# Patient Record
Sex: MALE | Race: WHITE | Employment: FULL TIME
[De-identification: names, ages, dates, MRNs, and addresses within clinical notes are randomized per-mention and may not be internally consistent; named-entity substitution may affect disease eponyms.]

---

## 2020-04-04 ENCOUNTER — NURSE TRIAGE (OUTPATIENT)
Dept: OTHER | Facility: CLINIC | Age: 50
End: 2020-04-04

## 2020-04-04 NOTE — TELEPHONE ENCOUNTER
Reason for Disposition   1] COVID-19 infection diagnosed or suspected AND [2] mild symptoms (fever, cough) AND [2] no trouble breathing or other complications    Answer Assessment - Initial Assessment Questions  1. COVID-19 DIAGNOSIS: \"Who made your Coronavirus (COVID-19) diagnosis? \" \"Was it confirmed by a positive lab test?\" If not diagnosed by a HCP, ask \"Are there lots of cases (community spread) where you live? \" (See public health department website, if unsure)    * MAJOR community spread: high number of cases; numbers of cases are increasing; many people hospitalized. * MINOR community spread: low number of cases; not increasing; few or no people hospitalized      Not diagnosed. No   2. ONSET: \"When did the COVID-19 symptoms start? \"       Wednesday evening. 3. WORST SYMPTOM: \"What is your worst symptom? \" (e.g., cough, fever, shortness of breath, muscle aches)      Fever on Wednesday. 4. COUGH: \"How bad is the cough? \"        Very, very mild. 5. FEVER: \"Do you have a fever? \" If so, ask: \"What is your temperature, how was it measured, and when did it start? \"      No fever anymore. 6. RESPIRATORY STATUS: \"Describe your breathing? \" (e.g., shortness of breath, wheezing, unable to speak)       Not SOB  7. BETTER-SAME-WORSE: Margrette Feathers you getting better, staying the same or getting worse compared to yesterday? \"  If getting worse, ask, \"In what way? \"      Better. 8. HIGH RISK DISEASE: \"Do you have any chronic medical problems? \" (e.g., asthma, heart or lung disease, weak immune system, etc.)      No  9. PREGNANCY: \"Is there any chance you are pregnant? \" \"When was your last menstrual period? \"      no  10. OTHER SYMPTOMS: \"Do you have any other symptoms? \"  (e.g., runny nose, headache, sore throat, loss of smell)        Stuffy nose,    Protocols used: CORONAVIRUS (COVID-19) DIAGNOSED OR SUSPECTED-ADULT-    Recommended to stay home. Call if symptoms worsen.

## 2020-04-20 ENCOUNTER — HOSPITAL ENCOUNTER (OUTPATIENT)
Age: 50
Setting detail: SPECIMEN
Discharge: HOME | End: 2020-04-20
Payer: COMMERCIAL

## 2020-04-20 ENCOUNTER — OFFICE VISIT (OUTPATIENT)
Dept: PRIMARY CARE CLINIC | Age: 50
End: 2020-04-20
Payer: COMMERCIAL

## 2020-04-20 VITALS
WEIGHT: 190 LBS | BODY MASS INDEX: 24.38 KG/M2 | TEMPERATURE: 97.3 F | OXYGEN SATURATION: 97 % | HEIGHT: 74 IN | HEART RATE: 97 BPM

## 2020-04-20 PROCEDURE — 99213 OFFICE O/P EST LOW 20 MIN: CPT | Performed by: NURSE PRACTITIONER

## 2020-04-20 PROCEDURE — 1036F TOBACCO NON-USER: CPT | Performed by: NURSE PRACTITIONER

## 2020-04-20 PROCEDURE — G8420 CALC BMI NORM PARAMETERS: HCPCS | Performed by: NURSE PRACTITIONER

## 2020-04-20 PROCEDURE — G8427 DOCREV CUR MEDS BY ELIG CLIN: HCPCS | Performed by: NURSE PRACTITIONER

## 2020-04-20 RX ORDER — DOXYCYCLINE HYCLATE 100 MG/1
100 CAPSULE ORAL 2 TIMES DAILY
COMMUNITY
Start: 2020-04-17

## 2020-04-20 ASSESSMENT — ENCOUNTER SYMPTOMS
COUGH: 1
CHEST TIGHTNESS: 0
SHORTNESS OF BREATH: 1
DIARRHEA: 0
NAUSEA: 0
WHEEZING: 0
EYE REDNESS: 0
VOMITING: 0

## 2020-04-20 NOTE — PATIENT INSTRUCTIONS
stool, or body fluids on them. Use a household cleaning spray or wipe, according to the label instructions. Labels contain instructions for safe and effective use of the cleaning product including precautions you should take when applying the product, such as wearing gloves and making sure you have good ventilation during use of the product. Monitor your symptoms  Seek prompt medical attention if your illness is worsening (e.g., difficulty breathing). Before seeking care, call your healthcare provider and tell them that you have, or are being evaluated for, COVID-19. Put on a facemask before you enter the facility. These steps will help the healthcare providers office to keep other people in the office or waiting room from getting infected or exposed. Persons who are placed under active monitoring or facilitated self-monitoring should follow instructions provided by their local health department or occupational health professionals, as appropriate. When working with your local health department check their available hours. If you have a medical emergency and need to call 911, notify the dispatch personnel that you have, or are being evaluated for COVID-19. If possible, put on a facemask before emergency medical services arrive. Discontinuing home isolation  Patients with confirmed COVID-19 should remain under home isolation precautions until the risk of secondary transmission to others is thought to be low. The decision to discontinue home isolation precautions should be made on a case-by-case basis, in consultation with your physician and the health department. Please do NOT make this decision on your own. If your results of the COVID-19 test is NEGATIVE -     The patient may stop isolation, in consultation with your health care provider, typically when: Your healthcare provider has determined that the cause of the illness is NOT COVID-19 and approves your return to work.   OR  Seven days have passed since onset of symptoms AND three days (72 hours) have passed with no fever without taking medication (like Tylenol) to reduce fever,  respiratory symptoms have resolved and you have been evaluated by your health care provider. Please follow up with your physician for evaluation about this. The following websites are the best places for up to date information on this fluid situation. https://coronavirus. [place].gov/wps/portal/gov/covid-19/home/local-health-districts-and-providers/guidance-for-covid-19-exposure-management    Preventing the Spread of Coronavirus Disease 2019 in Homes and Residential Communities     For the most recent information go to [employer]  https://coronavirus. [place].gov/wps/portal/gov/covid-19/home/local-health-districts-and-providers/guidance-for-covid-19-exposure-management

## 2020-04-20 NOTE — PROGRESS NOTES
place, and time. Psychiatric:         Mood and Affect: Mood normal.         Behavior: Behavior normal.       Pulse 97   Temp 97.3 °F (36.3 °C) (Temporal)   Ht 6' 2\" (1.88 m)   Wt 190 lb (86.2 kg)   SpO2 97%   BMI 24.39 kg/m²     Assessment:          Diagnosis Orders   1. Suspected COVID-19 virus infection  COVID-19 Ambulatory   2. History of recent pneumonia  COVID-19 Ambulatory       Plan:    looks well, feeling better since atb, states was recommended to get tested for covid by his pcp office. You were tested for COVID today. We will call you with results when they are available. If you have not heard from us in 7 days, please call our office. Continue your doxycycline. At this time you report you are feeling better, should you feel worse, chest pain, difficulty breathing, short of breath, go to the ER  Verbally instructed if coughing up blood- go to ER (states he is coughing up brown yellow)  Increase fluids- stay hydrated  Good handwashing  Supportive care as discussed    If having symptoms- you need to be fever free for 72 hours, other symptoms resolved and at least 7 days passed since first symptom appeared before discontinuing  quarantine- CDC guidelines  tylenol as directed as needed over the counter if able to take  go to the ER for chest pain, short of breath, hard time breathing, persistent vomiting, feeling weaker or dehydrated, any worsening, change or concern  Follow up with primary care for re evaluation  [place] covid 19 call center - 2-139-8-ASK- [phone]  Another good resource for information is coronavirus. [place].gov  CLINICAL INFORMATION: Chest pain, cough, and fever. COMPARISON: None. VIEWS: 1. IMPRESSION:    1.  Patchy right midlung and bibasilar airspace opacifications which could relate to viral pneumonia or other infectious process. 2.  No large effusions. 3.  Normal cardiomediastinal silhouette.     [id number]    Finalized by Loy Modi MD on 4/17/2020 10:11 AM

## 2020-04-23 LAB — SARS-COV-2, NAA: DETECTED

## 2024-06-17 ENCOUNTER — HOSPITAL ENCOUNTER (OUTPATIENT)
Age: 54
Setting detail: SPECIMEN
Discharge: HOME | End: 2024-06-17
Payer: COMMERCIAL

## 2024-06-17 LAB
25(OH)D3 SERPL-MCNC: 17.5 NG/ML (ref 30–100)
ALT SERPL-CCNC: 20 U/L (ref 5–41)
ANION GAP SERPL CALCULATED.3IONS-SCNC: 8 MMOL/L (ref 9–17)
BUN SERPL-MCNC: 18 MG/DL (ref 6–20)
BUN/CREAT SERPL: 16 (ref 9–20)
CALCIUM SERPL-MCNC: 9.1 MG/DL (ref 8.6–10.4)
CHLORIDE SERPL-SCNC: 104 MMOL/L (ref 98–107)
CHOLEST SERPL-MCNC: 192 MG/DL (ref 0–199)
CHOLESTEROL/HDL RATIO: 4
CO2 SERPL-SCNC: 29 MMOL/L (ref 20–31)
CREAT SERPL-MCNC: 1.1 MG/DL (ref 0.7–1.2)
GFR, ESTIMATED: 81 ML/MIN/1.73M2
GLUCOSE SERPL-MCNC: 88 MG/DL (ref 70–99)
HDLC SERPL-MCNC: 47 MG/DL
LDLC SERPL CALC-MCNC: 126 MG/DL (ref 0–100)
POTASSIUM SERPL-SCNC: 3.8 MMOL/L (ref 3.7–5.3)
PSA SERPL-MCNC: 1.6 NG/ML (ref 0–4)
SODIUM SERPL-SCNC: 141 MMOL/L (ref 135–144)
TRIGL SERPL-MCNC: 96 MG/DL
VLDLC SERPL CALC-MCNC: 19 MG/DL

## 2024-06-17 PROCEDURE — 84460 ALANINE AMINO (ALT) (SGPT): CPT

## 2024-06-17 PROCEDURE — 82306 VITAMIN D 25 HYDROXY: CPT

## 2024-06-17 PROCEDURE — G0103 PSA SCREENING: HCPCS

## 2024-06-17 PROCEDURE — 36415 COLL VENOUS BLD VENIPUNCTURE: CPT

## 2024-06-17 PROCEDURE — 80048 BASIC METABOLIC PNL TOTAL CA: CPT

## 2024-06-17 PROCEDURE — 80061 LIPID PANEL: CPT

## 2024-09-19 ENCOUNTER — OFFICE VISIT (OUTPATIENT)
Age: 54
End: 2024-09-19

## 2024-09-19 VITALS
RESPIRATION RATE: 16 BRPM | HEART RATE: 67 BPM | OXYGEN SATURATION: 95 % | SYSTOLIC BLOOD PRESSURE: 121 MMHG | DIASTOLIC BLOOD PRESSURE: 83 MMHG | WEIGHT: 211 LBS | HEIGHT: 73 IN | BODY MASS INDEX: 27.96 KG/M2

## 2024-09-19 DIAGNOSIS — Z12.11 ENCOUNTER FOR SCREENING COLONOSCOPY: Primary | ICD-10-CM

## 2024-09-25 ENCOUNTER — TELEPHONE (OUTPATIENT)
Age: 54
End: 2024-09-25

## 2024-09-25 NOTE — TELEPHONE ENCOUNTER
Called patient to schedule a procedure.  TM/SC/ MONDAY 11/18/2024 AT 11:30 AM/COLONOSCOPY SCREENING/ [facility]  PAT:11/5/2024 AT 8:00 AM NURSE CALL  BOWEL PREP SENT TO [facility]    Patient fully instructed /emailed 9/25/24  Orders pending

## 2024-11-01 RX ORDER — BISACODYL 5 MG/1
TABLET, DELAYED RELEASE ORAL
Qty: 4 TABLET | Refills: 0 | Status: SHIPPED | OUTPATIENT
Start: 2024-11-01

## 2024-11-01 RX ORDER — POLYETHYLENE GLYCOL 3350 17 G/17G
POWDER, FOR SOLUTION ORAL
Qty: 238 G | Refills: 0 | Status: SHIPPED | OUTPATIENT
Start: 2024-11-01

## 2024-11-01 RX ORDER — ONDANSETRON 4 MG/1
4 TABLET, FILM COATED ORAL EVERY 6 HOURS PRN
Qty: 10 TABLET | Refills: 0 | Status: SHIPPED | OUTPATIENT
Start: 2024-11-01

## 2024-11-05 ENCOUNTER — HOSPITAL ENCOUNTER (OUTPATIENT)
Dept: PREADMISSION TESTING | Age: 54
Discharge: HOME | End: 2024-11-09

## 2024-11-05 VITALS — BODY MASS INDEX: 26.51 KG/M2 | WEIGHT: 200 LBS | HEIGHT: 73 IN

## 2024-11-05 NOTE — PROGRESS NOTES
taken to the recovery area.  When you are alert and stable, you will receive instructions and be prepared for discharge.

## 2024-11-14 NOTE — PRE-PROCEDURE INSTRUCTIONS
No answer, left message ?                             Unable to leave message ?    When were you told to arrive at hospital ?  0930    Do you have a  ? YES    Are you on any blood thinners ?    NO                 If yes when did you stop taking ?    Do you have your prep Rx filled and instruction ?  YES    Nothing to eat the day before , only clear liquids.    Are you experiencing any covid symptoms ? NO    Do you have any infections or rash we should be aware of ? NO      Do you have the Hibiclens soap to use the night before and the morning of surgery ? NA    Nothing to eat or drink after midnight, only a sip of water to take any medication instructed to take the night before.  Wear comfortable clothing, leave any valuables at home, remove any jewelry and body piercing .   INSTRUCTED.

## 2024-11-15 ENCOUNTER — ANESTHESIA EVENT (OUTPATIENT)
Dept: ENDOSCOPY | Age: 54
End: 2024-11-15
Payer: COMMERCIAL

## 2024-11-18 ENCOUNTER — ANESTHESIA (OUTPATIENT)
Dept: ENDOSCOPY | Age: 54
End: 2024-11-18
Payer: COMMERCIAL

## 2024-11-18 ENCOUNTER — HOSPITAL ENCOUNTER (OUTPATIENT)
Age: 54
Setting detail: OUTPATIENT SURGERY
Discharge: HOME | End: 2024-11-18
Attending: SURGERY | Admitting: SURGERY
Payer: COMMERCIAL

## 2024-11-18 VITALS
SYSTOLIC BLOOD PRESSURE: 135 MMHG | RESPIRATION RATE: 18 BRPM | DIASTOLIC BLOOD PRESSURE: 71 MMHG | HEART RATE: 64 BPM | WEIGHT: 200 LBS | HEIGHT: 73 IN | OXYGEN SATURATION: 97 % | BODY MASS INDEX: 26.51 KG/M2 | TEMPERATURE: 96.9 F

## 2024-11-18 DIAGNOSIS — Z12.11 SCREEN FOR COLON CANCER: ICD-10-CM

## 2024-11-18 PROCEDURE — 3700000000 HC ANESTHESIA ATTENDED CARE: Performed by: SURGERY

## 2024-11-18 PROCEDURE — 3609010300 HC COLONOSCOPY W/BIOPSY SINGLE/MULTIPLE: Performed by: SURGERY

## 2024-11-18 PROCEDURE — 2500000003 HC RX 250 WO HCPCS

## 2024-11-18 PROCEDURE — 3700000001 HC ADD 15 MINUTES (ANESTHESIA): Performed by: SURGERY

## 2024-11-18 PROCEDURE — 7100000000 HC PACU RECOVERY - FIRST 15 MIN: Performed by: SURGERY

## 2024-11-18 PROCEDURE — 7100000011 HC PHASE II RECOVERY - ADDTL 15 MIN: Performed by: SURGERY

## 2024-11-18 PROCEDURE — 7100000030 HC ASPR PHASE II RECOVERY - FIRST 15 MIN: Performed by: SURGERY

## 2024-11-18 PROCEDURE — 88305 TISSUE EXAM BY PATHOLOGIST: CPT

## 2024-11-18 PROCEDURE — 2709999900 HC NON-CHARGEABLE SUPPLY: Performed by: SURGERY

## 2024-11-18 PROCEDURE — 7100000001 HC PACU RECOVERY - ADDTL 15 MIN: Performed by: SURGERY

## 2024-11-18 PROCEDURE — 7100000031 HC ASPR PHASE II RECOVERY - ADDTL 15 MIN: Performed by: SURGERY

## 2024-11-18 PROCEDURE — 6360000002 HC RX W HCPCS

## 2024-11-18 PROCEDURE — 2580000003 HC RX 258: Performed by: ANESTHESIOLOGY

## 2024-11-18 PROCEDURE — 7100000010 HC PHASE II RECOVERY - FIRST 15 MIN: Performed by: SURGERY

## 2024-11-18 RX ORDER — LIDOCAINE HYDROCHLORIDE 20 MG/ML
INJECTION, SOLUTION EPIDURAL; INFILTRATION; INTRACAUDAL; PERINEURAL
Status: DISCONTINUED | OUTPATIENT
Start: 2024-11-18 | End: 2024-11-18 | Stop reason: SDUPTHER

## 2024-11-18 RX ORDER — MIDAZOLAM HYDROCHLORIDE 1 MG/ML
INJECTION, SOLUTION INTRAMUSCULAR; INTRAVENOUS
Status: DISCONTINUED | OUTPATIENT
Start: 2024-11-18 | End: 2024-11-18 | Stop reason: SDUPTHER

## 2024-11-18 RX ORDER — DEXAMETHASONE SODIUM PHOSPHATE 4 MG/ML
INJECTION, SOLUTION INTRA-ARTICULAR; INTRALESIONAL; INTRAMUSCULAR; INTRAVENOUS; SOFT TISSUE
Status: DISCONTINUED | OUTPATIENT
Start: 2024-11-18 | End: 2024-11-18 | Stop reason: SDUPTHER

## 2024-11-18 RX ORDER — SIMETHICONE 40MG/0.6ML
SUSPENSION, DROPS(FINAL DOSAGE FORM)(ML) ORAL PRN
Status: DISCONTINUED | OUTPATIENT
Start: 2024-11-18 | End: 2024-11-18 | Stop reason: ALTCHOICE

## 2024-11-18 RX ORDER — ONDANSETRON 2 MG/ML
INJECTION INTRAMUSCULAR; INTRAVENOUS
Status: DISCONTINUED | OUTPATIENT
Start: 2024-11-18 | End: 2024-11-18 | Stop reason: SDUPTHER

## 2024-11-18 RX ORDER — LIDOCAINE HYDROCHLORIDE 10 MG/ML
1 INJECTION, SOLUTION EPIDURAL; INFILTRATION; INTRACAUDAL; PERINEURAL
Status: DISCONTINUED | OUTPATIENT
Start: 2024-11-18 | End: 2024-11-18 | Stop reason: HOSPADM

## 2024-11-18 RX ORDER — SODIUM CHLORIDE 9 MG/ML
INJECTION, SOLUTION INTRAVENOUS CONTINUOUS
Status: DISCONTINUED | OUTPATIENT
Start: 2024-11-18 | End: 2024-11-18 | Stop reason: HOSPADM

## 2024-11-18 RX ORDER — SODIUM CHLORIDE 0.9 % (FLUSH) 0.9 %
5-40 SYRINGE (ML) INJECTION PRN
Status: DISCONTINUED | OUTPATIENT
Start: 2024-11-18 | End: 2024-11-18 | Stop reason: HOSPADM

## 2024-11-18 RX ORDER — SODIUM CHLORIDE 9 MG/ML
INJECTION, SOLUTION INTRAVENOUS PRN
Status: DISCONTINUED | OUTPATIENT
Start: 2024-11-18 | End: 2024-11-18 | Stop reason: HOSPADM

## 2024-11-18 RX ORDER — PROPOFOL 10 MG/ML
INJECTION, EMULSION INTRAVENOUS
Status: DISCONTINUED | OUTPATIENT
Start: 2024-11-18 | End: 2024-11-18 | Stop reason: SDUPTHER

## 2024-11-18 RX ORDER — SODIUM CHLORIDE 0.9 % (FLUSH) 0.9 %
5-40 SYRINGE (ML) INJECTION EVERY 12 HOURS SCHEDULED
Status: DISCONTINUED | OUTPATIENT
Start: 2024-11-18 | End: 2024-11-18 | Stop reason: HOSPADM

## 2024-11-18 RX ADMIN — SODIUM CHLORIDE, PRESERVATIVE FREE 10 ML: 5 INJECTION INTRAVENOUS at 10:13

## 2024-11-18 RX ADMIN — ONDANSETRON 4 MG: 2 INJECTION, SOLUTION INTRAMUSCULAR; INTRAVENOUS at 11:55

## 2024-11-18 RX ADMIN — PROPOFOL 50 MG: 10 INJECTION, EMULSION INTRAVENOUS at 12:06

## 2024-11-18 RX ADMIN — PROPOFOL 50 MG: 10 INJECTION, EMULSION INTRAVENOUS at 11:57

## 2024-11-18 RX ADMIN — LIDOCAINE HYDROCHLORIDE 100 MG: 20 INJECTION, SOLUTION EPIDURAL; INFILTRATION; INTRACAUDAL; PERINEURAL at 11:51

## 2024-11-18 RX ADMIN — MIDAZOLAM 2 MG: 1 INJECTION INTRAMUSCULAR; INTRAVENOUS at 11:54

## 2024-11-18 RX ADMIN — PROPOFOL 200 MG: 10 INJECTION, EMULSION INTRAVENOUS at 11:51

## 2024-11-18 RX ADMIN — SODIUM CHLORIDE: 9 INJECTION, SOLUTION INTRAVENOUS at 11:47

## 2024-11-18 RX ADMIN — DEXAMETHASONE SODIUM PHOSPHATE 4 MG: 4 INJECTION INTRA-ARTICULAR; INTRALESIONAL; INTRAMUSCULAR; INTRAVENOUS; SOFT TISSUE at 11:55

## 2024-11-18 ASSESSMENT — ENCOUNTER SYMPTOMS
NAUSEA: 0
SHORTNESS OF BREATH: 0
SINUS PRESSURE: 0
ABDOMINAL PAIN: 0

## 2024-11-18 ASSESSMENT — PAIN - FUNCTIONAL ASSESSMENT
PAIN_FUNCTIONAL_ASSESSMENT: 0-10
PAIN_FUNCTIONAL_ASSESSMENT: NONE - DENIES PAIN
PAIN_FUNCTIONAL_ASSESSMENT: 0-10

## 2024-11-18 NOTE — ANESTHESIA POSTPROCEDURE EVALUATION
Department of Anesthesiology  Postprocedure Note    Patient: Parmjit Knight  MRN: [id number]  YOB: 1970  Date of evaluation: 11/18/2024    Procedure Summary       Date: 11/18/24 Room / Location: [place] [address] / [facility]    Anesthesia Start: 1147 Anesthesia Stop: 1244    Procedure: COLONOSCOPY BIOPSY Diagnosis:       Screen for colon cancer      (Screen for colon cancer [Z12.11])    Surgeons: Logan Aguilera MD Responsible Provider: Jose Villalobos MD    Anesthesia Type: general ASA Status: 1            Anesthesia Type: No value filed.    Ila Phase I: Ila Score: 10    Ila Phase II:      Anesthesia Post Evaluation    Patient location during evaluation: PACU  Patient participation: complete - patient participated  Level of consciousness: awake and alert  Airway patency: patent  Nausea & Vomiting: no vomiting  Cardiovascular status: hemodynamically stable  Respiratory status: acceptable  Hydration status: euvolemic  Comments: POST- ANESTHESIA EVALUATION       Pt Name: Parmjit Knight  MRN: [id number]  YOB: 1970  Date of evaluation: 11/18/2024  Time:  1:29 PM      /71   Pulse 64   Temp 96.9 °F (36.1 °C) (Infrared)   Resp 18   Ht 1.854 m (6' 1\")   Wt 90.7 kg (200 lb)   SpO2 97%   BMI 26.39 kg/m²      Consciousness Level  Awake  Cardiopulmonary Status  Stable  Pain Adequately Treated YES  Nausea / Vomiting  NO  Adequate Hydration  YES  Anesthesia Related Complications NONE      Electronically signed by Jose Villalobos MD on 11/18/2024 at 1:29 PM         Pain management: satisfactory to patient    No notable events documented.

## 2024-11-18 NOTE — H&P
HISTORY and PHYSICAL  [facility]       NAME:  Parmjit Knight  MRN: [id number]   YOB: 1970   Date: 11/18/2024   Age: 54 y.o.  Gender: male       COMPLAINT AND PRESENT HISTORY:         Parmjit Knight is 54 y.o.,   male, having a Screening Colonoscopy.      Patient is being seen for  Pre-Op Diagnosis Codes:      * Screen for colon cancer     This is patient's first Colonoscopy.    Pt denies abdominal pain including bloating/gas.    No changes in bowel habits.  No diarrhea, constipation, blood in stools, black tarry stools.     No changes in appetite and unintended weight loss. Denies any nausea or vomiting. No  heartburn, indigestion or acid reflux.    Denies Family Hx of colon cancer.    Medical history reviewed:  Patient voices feeling well today. Denies any recent fever or chills, chest pain/pressure/palpitations.       Pt reports no SOB.     Patient has been NPO since midnight.   Pt took zofran this am    Patient states  has completed and followed prescribed prep with clear outcome.      Patient has hx of PONV,  pt states that he last time with anesthesia was at age 11. denies any family problems with anesthesia     RECENT LABS, IMAGING AND TESTING     No results found for: \"WBC\", \"RBC\", \"HGB\", \"HCT\", \"MCV\", \"MCH\", \"MCHC\", \"RDW\", \"PLT\", \"MPV\"     Lab Results   Component Value Date     06/17/2024    K 3.8 06/17/2024     06/17/2024    CO2 29 06/17/2024    BUN 18 06/17/2024    CREATININE 1.1 06/17/2024    GLUCOSE 88 06/17/2024    CALCIUM 9.1 06/17/2024    ALT 20 06/17/2024         PAST MEDICAL HISTORY     Past Medical History:   Diagnosis Date    PONV (postoperative nausea and vomiting)        SURGICAL HISTORY       Past Surgical History:   Procedure Laterality Date    OTHER SURGICAL HISTORY      Lymph node removed as a child       FAMILY HISTORY     No family history on file.    SOCIAL HISTORY       Social History     Socioeconomic History    Marital status:

## 2024-11-18 NOTE — OP NOTE
colonoscopy: 3 years.  If screening is less than 10 years the recommended reason is due: Fair preparation and colon polyp    The colon was decompressed.  While withdrawing the scope the above findings were verified and the scope was removed.  The patient tolerated the procedure and conscious sedation without unusual events.    In the recovery room patient was examined and remains hemodynamically stable.  Discharge home when criteria met.    Recommendations/Plan:   F/U Biopsies  F/U In Office as instructed  Discussed with the family  High fiber diet   Precautions to avoid constipation    Electronically signed by Logan Aguilera MD  on 11/18/2024 at 12:05 PM

## 2024-11-18 NOTE — ANESTHESIA PRE PROCEDURE
status: Never   • Smokeless tobacco: Never   Substance Use Topics   • Alcohol use: Not on file     Comment: occasional                                Counseling given: Not Answered      Vital Signs (Current):   Vitals:    11/18/24 0957   BP: (!) 134/96   Pulse: 71   Resp: 16   Temp: 98.1 °F (36.7 °C)   TempSrc: Infrared   SpO2: 95%   Weight: 90.7 kg (200 lb)   Height: 1.854 m (6' 1\")                                              BP Readings from Last 3 Encounters:   11/18/24 (!) 134/96   09/19/24 121/83       NPO Status: Time of last liquid consumption: 1930                        Time of last solid consumption: 2300                        Date of last liquid consumption: 11/17/24                        Date of last solid food consumption: 11/16/24    BMI:   Wt Readings from Last 3 Encounters:   11/18/24 90.7 kg (200 lb)   11/05/24 90.7 kg (200 lb)   09/19/24 95.7 kg (211 lb)     Body mass index is 26.39 kg/m².    CBC: No results found for: \"WBC\", \"RBC\", \"HGB\", \"HCT\", \"MCV\", \"RDW\", \"PLT\"    CMP:   Lab Results   Component Value Date/Time     06/17/2024 04:30 AM    K 3.8 06/17/2024 04:30 AM     06/17/2024 04:30 AM    CO2 29 06/17/2024 04:30 AM    BUN 18 06/17/2024 04:30 AM    CREATININE 1.1 06/17/2024 04:30 AM    GFRAA >60 02/04/2017 05:30 AM    LABGLOM 81 06/17/2024 04:30 AM    GLUCOSE 88 06/17/2024 04:30 AM    CALCIUM 9.1 06/17/2024 04:30 AM    ALT 20 06/17/2024 04:30 AM       POC Tests: No results for input(s): \"POCGLU\", \"POCNA\", \"POCK\", \"POCCL\", \"POCBUN\", \"POCHEMO\", \"POCHCT\" in the last 72 hours.    Coags: No results found for: \"PROTIME\", \"INR\", \"APTT\"    HCG (If Applicable): No results found for: \"PREGTESTUR\", \"PREGSERUM\", \"HCG\", \"HCGQUANT\"     ABGs: No results found for: \"PHART\", \"PO2ART\", \"ZFJ5UBT\", \"ZDD0KVH\", \"BEART\", \"A3KUNDSE\"     Type & Screen (If Applicable):  No results found for: \"ABORH\", \"LABANTI\"    Drug/Infectious Status (If Applicable):  No results found for: \"HIV\", \"HEPCAB\"    COVID-19

## 2024-11-21 LAB — SURGICAL PATHOLOGY REPORT: NORMAL

## 2024-12-05 ENCOUNTER — OFFICE VISIT (OUTPATIENT)
Age: 54
End: 2024-12-05
Payer: COMMERCIAL

## 2024-12-05 VITALS
WEIGHT: 207 LBS | HEIGHT: 74 IN | BODY MASS INDEX: 26.56 KG/M2 | TEMPERATURE: 97.9 F | DIASTOLIC BLOOD PRESSURE: 71 MMHG | SYSTOLIC BLOOD PRESSURE: 110 MMHG | OXYGEN SATURATION: 96 % | HEART RATE: 68 BPM

## 2024-12-05 DIAGNOSIS — K64.8 INTERNAL HEMORRHOID: ICD-10-CM

## 2024-12-05 DIAGNOSIS — K63.5 BENIGN COLON POLYP: ICD-10-CM

## 2024-12-05 DIAGNOSIS — K57.30 SIGMOID DIVERTICULOSIS: Primary | ICD-10-CM

## 2024-12-05 PROCEDURE — 3017F COLORECTAL CA SCREEN DOC REV: CPT | Performed by: SURGERY

## 2024-12-05 PROCEDURE — 1036F TOBACCO NON-USER: CPT | Performed by: SURGERY

## 2024-12-05 PROCEDURE — G8419 CALC BMI OUT NRM PARAM NOF/U: HCPCS | Performed by: SURGERY

## 2024-12-05 PROCEDURE — 99213 OFFICE O/P EST LOW 20 MIN: CPT | Performed by: SURGERY

## 2024-12-05 PROCEDURE — G8484 FLU IMMUNIZE NO ADMIN: HCPCS | Performed by: SURGERY

## 2024-12-05 PROCEDURE — G8427 DOCREV CUR MEDS BY ELIG CLIN: HCPCS | Performed by: SURGERY

## (undated) DEVICE — FORCEPS BX L240CM JAW DIA2.4MM ORNG L CAP W/ NDL DISP RAD

## (undated) DEVICE — ENDO KIT W/SYRINGE: Brand: MEDLINE INDUSTRIES, INC.

## (undated) DEVICE — GLOVE ORANGE PI 7 1/2   MSG9075

## (undated) DEVICE — KIT CLN UP LIN W/ STD SAHARA TBL SHT 40X60IN DRAW/LIFT SHT

## (undated) DEVICE — DEFENDO AIR WATER SUCTION AND BIOPSY VALVE KIT FOR  OLYMPUS: Brand: DEFENDO AIR/WATER/SUCTION AND BIOPSY VALVE